# Patient Record
Sex: FEMALE | Race: WHITE
[De-identification: names, ages, dates, MRNs, and addresses within clinical notes are randomized per-mention and may not be internally consistent; named-entity substitution may affect disease eponyms.]

---

## 2017-11-27 NOTE — RAD
LEFT WRIST 3 VIEWS:

 

Date:  11/27/17

 

HISTORY:  

Injury. Pain. 

 

FINDINGS:

Intercarpal and intercarpal joint spaces are preserved. With regard to the carpal bones, no evidence 
of fracture. 

 

There appears to be a nondisplaced fracture of the proximal aspect of the fifth metacarpal. Evaluatio
n is limited on this exam. Dedicated left hand radiograph series is recommended. 

 

At the level of the distal radius and ulna, there does appear to be some soft tissue swelling. 

 

IMPRESSION: 

1.  Soft tissue swelling at the level of the distal radius and ulna without evidence of fracture. 

 

2. There appears to be a nondisplaced fracture of the proximal fifth metacarpal. Correlate clinically
. Additional imaging is recommended. 

 

 

POS: EILEEN

## 2019-07-17 ENCOUNTER — HOSPITAL ENCOUNTER (OUTPATIENT)
Dept: HOSPITAL 92 - BICMAMMO | Age: 69
Discharge: HOME | End: 2019-07-17
Attending: OBSTETRICS & GYNECOLOGY
Payer: MEDICARE

## 2019-07-17 DIAGNOSIS — Z12.31: Primary | ICD-10-CM

## 2019-07-17 DIAGNOSIS — Z78.0: ICD-10-CM

## 2019-07-17 DIAGNOSIS — Z98.82: ICD-10-CM

## 2019-07-17 DIAGNOSIS — M85.88: ICD-10-CM

## 2019-07-17 DIAGNOSIS — M81.0: ICD-10-CM

## 2019-07-17 DIAGNOSIS — Z13.820: ICD-10-CM

## 2019-07-17 PROCEDURE — 77063 BREAST TOMOSYNTHESIS BI: CPT

## 2019-07-17 PROCEDURE — 77080 DXA BONE DENSITY AXIAL: CPT

## 2019-07-17 PROCEDURE — 77067 SCR MAMMO BI INCL CAD: CPT

## 2019-07-17 NOTE — BD
Exam:  DEXA Bone Density

7/17/19

 

HISTORY: 

69-year-old postmenopausal female for screening. 

 

FINDINGS: 

 

Lumbar Spine:       BMD (g/cm2)      T-SCORE

    L1              0.807            -1.7                

    L2              0.831            -1.8                

    L3              0.889            -1.8                

    L4              0.898            -1.5                

 

    L1-L4           0.861            -1.7                

 

Left Femoral Neck:  0.523            -2.9                     

 

Total Proximal Left Femur: 0.682      -2.1                       

 

Impression:

Osteoporosis. 

 

POS: OFF

## 2019-07-17 NOTE — MMO
Bilateral MAMMO Bilat Screen [facility].

 

CLINICAL HISTORY:

Patient is 69 years old and is seen for screening. The patient has no family

history of breast cancer.  The patient has no personal history of cancer. The

patient has a history of bilateral Implants in 1989.

 

VIEWS:

The views performed were:  bilateral craniocaudal; bilateral craniocaudal with

tomosynthesis; bilateral mediolateral oblique; bilateral mediolateral oblique

with tomosynthesis; and bilateral Implant displaced with tomosynthesis.

 

FILMS COMPARED:

The present examination has been compared to prior imaging studies performed at

[facility] on 05/23/2016, 06/20/2017 and 06/28/2018, and at [facility] on 03/06/2012.

 

MAMMOGRAM FINDINGS:

There are scattered fibroglandular densities.

 

There are no suspicious masses, suspicious calcifications, or new areas of

architectural distortion.

 

IMPRESSION:

THERE IS NO MAMMOGRAPHIC EVIDENCE OF MALIGNANCY.

 

A ROUTINE FOLLOW-UP MAMMOGRAM IN 1 YEAR IS RECOMMENDED.

 

THE RESULTS OF THIS EXAM WERE SENT TO THE PATIENT.

 

ACR BI-RADS Category 1 - Negative

 

MAMMOGRAPHY NOTE:

 1. A negative mammogram report should not delay a biopsy if a dominant of

 clinically suspicious mass is present.

 2. Approximately 10% to 15% of breast cancers are not detected by

 mammography.

 3. Adenosis and dense breasts may obscure an underlying neoplasm.

 

 

Reported by: LAUREN JOHNSON MD    Electonically Signed: [id number]

## 2020-07-20 ENCOUNTER — HOSPITAL ENCOUNTER (OUTPATIENT)
Dept: HOSPITAL 92 - BICMAMMO | Age: 70
Discharge: HOME | End: 2020-07-20
Attending: OBSTETRICS & GYNECOLOGY
Payer: MEDICARE

## 2020-07-20 DIAGNOSIS — Z12.31: Primary | ICD-10-CM

## 2020-07-20 DIAGNOSIS — Z98.82: ICD-10-CM

## 2020-07-20 PROCEDURE — 77067 SCR MAMMO BI INCL CAD: CPT

## 2020-07-20 PROCEDURE — 77063 BREAST TOMOSYNTHESIS BI: CPT

## 2020-07-20 NOTE — MMO
Bilateral MAMMO Bilat Screen [facility].

 

CLINICAL HISTORY:

Patient is 70 years old and is seen for screening. The patient has no family

history of breast cancer.  The patient has no personal history of cancer. The

patient has a history of bilateral Implants in 1989.

 

VIEWS:

The views performed were:  bilateral craniocaudal with tomosynthesis; bilateral

mediolateral oblique with tomosynthesis; and bilateral Implant displaced with

tomosynthesis.

 

FILMS COMPARED:

The present examination has been compared to prior imaging studies performed at

[facility] on 05/23/2016, 06/20/2017, 06/28/2018 and 07/17/2019.

 

This study has been interpreted with the assistance of computer-aided detection.

 

MAMMOGRAM FINDINGS:

There are scattered fibroglandular densities.

 

There are no suspicious masses, suspicious calcifications, or new areas of

architectural distortion.

 

IMPRESSION:

THERE IS NO MAMMOGRAPHIC EVIDENCE OF MALIGNANCY.

 

A ROUTINE FOLLOW-UP MAMMOGRAM IN 1 YEAR IS RECOMMENDED.

 

THE RESULTS OF THIS EXAM WERE SENT TO THE PATIENT.

 

ACR BI-RADS Category 1 - Negative

 

MAMMOGRAPHY NOTE:

 1. A negative mammogram report should not delay a biopsy if a dominant of

 clinically suspicious mass is present.

 2. Approximately 10% to 15% of breast cancers are not detected by

 mammography.

 3. Adenosis and dense breasts may obscure an underlying neoplasm.

 

 

Reported by: MARY RODRÍGUEZ MD

Electonically Signed: [id number]

## 2021-10-21 ENCOUNTER — HOSPITAL ENCOUNTER (OUTPATIENT)
Dept: HOSPITAL 92 - BICMAMMO | Age: 71
Discharge: HOME | End: 2021-10-21
Attending: OBSTETRICS & GYNECOLOGY
Payer: MEDICARE

## 2021-10-21 DIAGNOSIS — Z98.82: ICD-10-CM

## 2021-10-21 DIAGNOSIS — Z12.31: Primary | ICD-10-CM

## 2021-10-21 PROCEDURE — 77063 BREAST TOMOSYNTHESIS BI: CPT

## 2021-10-21 PROCEDURE — 77067 SCR MAMMO BI INCL CAD: CPT

## 2022-12-09 ENCOUNTER — HOSPITAL ENCOUNTER (OUTPATIENT)
Dept: HOSPITAL 92 - BICMAMMO | Age: 72
Discharge: HOME | End: 2022-12-09
Attending: OBSTETRICS & GYNECOLOGY
Payer: MEDICARE

## 2022-12-09 DIAGNOSIS — Z12.31: Primary | ICD-10-CM

## 2022-12-09 DIAGNOSIS — Z98.82: ICD-10-CM

## 2022-12-09 PROCEDURE — 77063 BREAST TOMOSYNTHESIS BI: CPT

## 2022-12-09 PROCEDURE — 77067 SCR MAMMO BI INCL CAD: CPT

## 2023-08-18 ENCOUNTER — HOSPITAL ENCOUNTER (OUTPATIENT)
Dept: HOSPITAL 92 - BICMAMMO | Age: 73
Discharge: HOME | End: 2023-08-18
Attending: INTERNAL MEDICINE
Payer: MEDICARE

## 2023-08-18 DIAGNOSIS — M81.0: ICD-10-CM

## 2023-08-18 DIAGNOSIS — Z13.820: Primary | ICD-10-CM

## 2023-08-18 DIAGNOSIS — Z78.0: ICD-10-CM

## 2023-08-18 DIAGNOSIS — M85.88: ICD-10-CM

## 2023-08-18 PROCEDURE — 77080 DXA BONE DENSITY AXIAL: CPT

## 2023-10-10 ENCOUNTER — HOSPITAL ENCOUNTER (OUTPATIENT)
Dept: HOSPITAL 92 - BICCT | Age: 73
Discharge: HOME | End: 2023-10-10
Attending: PHYSICIAN ASSISTANT
Payer: MEDICARE

## 2023-10-10 DIAGNOSIS — R63.4: ICD-10-CM

## 2023-10-10 DIAGNOSIS — K59.00: ICD-10-CM

## 2023-10-10 DIAGNOSIS — R19.7: ICD-10-CM

## 2023-10-10 DIAGNOSIS — R10.31: Primary | ICD-10-CM

## 2023-10-10 DIAGNOSIS — Z90.710: ICD-10-CM

## 2023-10-10 LAB — EGFRCR SERPLBLD CKD-EPI 2021: 95 ML/MIN/{1.73_M2}

## 2023-10-10 PROCEDURE — 82565 ASSAY OF CREATININE: CPT

## 2023-10-10 PROCEDURE — 74177 CT ABD & PELVIS W/CONTRAST: CPT

## 2024-09-23 ENCOUNTER — HOSPITAL ENCOUNTER (OUTPATIENT)
Dept: HOSPITAL 92 - BICMAMMO | Age: 74
Discharge: HOME | End: 2024-09-23
Attending: INTERNAL MEDICINE
Payer: MEDICARE

## 2024-09-23 DIAGNOSIS — Z12.31: Primary | ICD-10-CM

## 2024-09-23 DIAGNOSIS — Z80.3: ICD-10-CM

## 2024-09-23 DIAGNOSIS — Z98.82: ICD-10-CM

## 2024-09-23 PROCEDURE — 77067 SCR MAMMO BI INCL CAD: CPT

## 2024-12-10 ENCOUNTER — HOSPITAL ENCOUNTER (OUTPATIENT)
Dept: HOSPITAL 92 - BICRAD | Age: 74
Discharge: HOME | End: 2024-12-10
Attending: INTERNAL MEDICINE
Payer: MEDICARE

## 2024-12-10 DIAGNOSIS — M79.642: ICD-10-CM

## 2024-12-10 DIAGNOSIS — M54.50: ICD-10-CM

## 2024-12-10 DIAGNOSIS — M19.042: ICD-10-CM

## 2024-12-10 DIAGNOSIS — L40.9: ICD-10-CM

## 2024-12-10 DIAGNOSIS — M47.816: ICD-10-CM

## 2024-12-10 DIAGNOSIS — M79.641: Primary | ICD-10-CM

## 2024-12-10 PROCEDURE — 72100 X-RAY EXAM L-S SPINE 2/3 VWS: CPT
